# Patient Record
Sex: MALE | Race: ASIAN | NOT HISPANIC OR LATINO
[De-identification: names, ages, dates, MRNs, and addresses within clinical notes are randomized per-mention and may not be internally consistent; named-entity substitution may affect disease eponyms.]

---

## 2017-12-13 ENCOUNTER — APPOINTMENT (OUTPATIENT)
Dept: INTERNAL MEDICINE | Facility: CLINIC | Age: 72
End: 2017-12-13
Payer: MEDICARE

## 2017-12-13 ENCOUNTER — LABORATORY RESULT (OUTPATIENT)
Age: 72
End: 2017-12-13

## 2017-12-13 VITALS
OXYGEN SATURATION: 98 % | TEMPERATURE: 98.4 F | HEART RATE: 61 BPM | DIASTOLIC BLOOD PRESSURE: 80 MMHG | BODY MASS INDEX: 22.22 KG/M2 | RESPIRATION RATE: 16 BRPM | WEIGHT: 150 LBS | SYSTOLIC BLOOD PRESSURE: 120 MMHG | HEIGHT: 69 IN

## 2017-12-13 DIAGNOSIS — D64.9 ANEMIA, UNSPECIFIED: ICD-10-CM

## 2017-12-13 DIAGNOSIS — Z78.9 OTHER SPECIFIED HEALTH STATUS: ICD-10-CM

## 2017-12-13 DIAGNOSIS — Z82.3 FAMILY HISTORY OF STROKE: ICD-10-CM

## 2017-12-13 DIAGNOSIS — R09.89 OTHER SPECIFIED SYMPTOMS AND SIGNS INVOLVING THE CIRCULATORY AND RESPIRATORY SYSTEMS: ICD-10-CM

## 2017-12-13 DIAGNOSIS — Z82.49 FAMILY HISTORY OF ISCHEMIC HEART DISEASE AND OTHER DISEASES OF THE CIRCULATORY SYSTEM: ICD-10-CM

## 2017-12-13 DIAGNOSIS — H40.059 OCULAR HYPERTENSION, UNSPECIFIED EYE: ICD-10-CM

## 2017-12-13 PROCEDURE — G0008: CPT

## 2017-12-13 PROCEDURE — 90732 PPSV23 VACC 2 YRS+ SUBQ/IM: CPT

## 2017-12-13 PROCEDURE — G0439: CPT

## 2017-12-13 PROCEDURE — 90686 IIV4 VACC NO PRSV 0.5 ML IM: CPT

## 2017-12-13 PROCEDURE — G0009: CPT

## 2017-12-14 LAB
25(OH)D3 SERPL-MCNC: 15.3 NG/ML
ALBUMIN SERPL ELPH-MCNC: 4.2 G/DL
ALP BLD-CCNC: 44 U/L
ALT SERPL-CCNC: 17 U/L
ANION GAP SERPL CALC-SCNC: 12 MMOL/L
APPEARANCE: CLEAR
AST SERPL-CCNC: 14 U/L
BASOPHILS # BLD AUTO: 0.07 K/UL
BASOPHILS NFR BLD AUTO: 1.7 %
BILIRUB SERPL-MCNC: 0.8 MG/DL
BILIRUBIN URINE: NEGATIVE
BLOOD URINE: NEGATIVE
BUN SERPL-MCNC: 18 MG/DL
CALCIUM SERPL-MCNC: 9 MG/DL
CHLORIDE SERPL-SCNC: 106 MMOL/L
CHOLEST SERPL-MCNC: 244 MG/DL
CHOLEST/HDLC SERPL: 4.7 RATIO
CO2 SERPL-SCNC: 26 MMOL/L
COLOR: YELLOW
CREAT SERPL-MCNC: 1.07 MG/DL
EOSINOPHIL # BLD AUTO: 0.04 K/UL
EOSINOPHIL NFR BLD AUTO: 0.9
GLUCOSE QUALITATIVE U: NEGATIVE MG/DL
GLUCOSE SERPL-MCNC: 110 MG/DL
HBA1C MFR BLD HPLC: 6 %
HCT VFR BLD CALC: 40.6 %
HDLC SERPL-MCNC: 52 MG/DL
HGB BLD-MCNC: 12.7 G/DL
IRON SATN MFR SERPL: 48 %
IRON SERPL-MCNC: 139 UG/DL
KETONES URINE: NEGATIVE
LDLC SERPL CALC-MCNC: 176 MG/DL
LEUKOCYTE ESTERASE URINE: NEGATIVE
LYMPHOCYTES # BLD AUTO: 1.45 K/UL
LYMPHOCYTES NFR BLD AUTO: 33 %
MAN DIFF?: NORMAL
MCHC RBC-ENTMCNC: 21.9 PG
MCHC RBC-ENTMCNC: 31.3 GM/DL
MCV RBC AUTO: 69.9 FL
MONOCYTES # BLD AUTO: 0.34 K/UL
MONOCYTES NFR BLD AUTO: 7.8 %
NEUTROPHILS # BLD AUTO: 2.22 K/UL
NEUTROPHILS NFR BLD AUTO: 50.4 %
NITRITE URINE: NEGATIVE
PH URINE: 6
PLATELET # BLD AUTO: 182 K/UL
POTASSIUM SERPL-SCNC: 4.5 MMOL/L
PROT SERPL-MCNC: 6.9 G/DL
PROTEIN URINE: NEGATIVE MG/DL
PSA SERPL-MCNC: 2.29 NG/ML
RBC # BLD: 5.81 M/UL
RBC # BLD: 5.81 M/UL
RBC # FLD: 15.3 %
RETICS # AUTO: 0.7 %
RETICS AGGREG/RBC NFR: 39.5 K/UL
SODIUM SERPL-SCNC: 144 MMOL/L
SPECIFIC GRAVITY URINE: 1.03
TIBC SERPL-MCNC: 289 UG/DL
TRIGL SERPL-MCNC: 78 MG/DL
TSH SERPL-ACNC: 1.66 UIU/ML
UIBC SERPL-MCNC: 150 UG/DL
UROBILINOGEN URINE: NEGATIVE MG/DL
WBC # FLD AUTO: 4.4 K/UL

## 2017-12-14 RX ORDER — ERGOCALCIFEROL 1.25 MG/1
1.25 MG CAPSULE, LIQUID FILLED ORAL
Qty: 12 | Refills: 3 | Status: ACTIVE | COMMUNITY
Start: 2017-12-14 | End: 1900-01-01

## 2017-12-19 LAB — HEMOCCULT STL QL IA: NEGATIVE

## 2017-12-20 ENCOUNTER — TRANSCRIPTION ENCOUNTER (OUTPATIENT)
Age: 72
End: 2017-12-20

## 2018-02-13 ENCOUNTER — APPOINTMENT (OUTPATIENT)
Dept: DERMATOLOGY | Facility: CLINIC | Age: 73
End: 2018-02-13
Payer: MEDICARE

## 2018-02-13 VITALS
WEIGHT: 155 LBS | HEIGHT: 69 IN | HEART RATE: 61 BPM | BODY MASS INDEX: 22.96 KG/M2 | DIASTOLIC BLOOD PRESSURE: 88 MMHG | SYSTOLIC BLOOD PRESSURE: 152 MMHG | TEMPERATURE: 98.2 F

## 2018-02-13 DIAGNOSIS — L23.9 ALLERGIC CONTACT DERMATITIS, UNSPECIFIED CAUSE: ICD-10-CM

## 2018-02-13 DIAGNOSIS — L57.0 ACTINIC KERATOSIS: ICD-10-CM

## 2018-02-13 PROCEDURE — 99203 OFFICE O/P NEW LOW 30 MIN: CPT | Mod: 25

## 2018-02-13 PROCEDURE — 17000 DESTRUCT PREMALG LESION: CPT

## 2018-02-20 ENCOUNTER — APPOINTMENT (OUTPATIENT)
Dept: INTERNAL MEDICINE | Facility: CLINIC | Age: 73
End: 2018-02-20
Payer: MEDICARE

## 2018-02-20 VITALS
BODY MASS INDEX: 23.11 KG/M2 | SYSTOLIC BLOOD PRESSURE: 100 MMHG | DIASTOLIC BLOOD PRESSURE: 58 MMHG | WEIGHT: 156 LBS | HEART RATE: 74 BPM | HEIGHT: 69 IN | RESPIRATION RATE: 16 BRPM | OXYGEN SATURATION: 98 % | TEMPERATURE: 98 F

## 2018-02-20 PROCEDURE — 99213 OFFICE O/P EST LOW 20 MIN: CPT

## 2018-03-20 ENCOUNTER — LABORATORY RESULT (OUTPATIENT)
Age: 73
End: 2018-03-20

## 2018-03-20 ENCOUNTER — APPOINTMENT (OUTPATIENT)
Dept: DERMATOLOGY | Facility: CLINIC | Age: 73
End: 2018-03-20

## 2018-03-20 LAB — HBA1C MFR BLD HPLC: 6 %

## 2018-03-21 LAB
CHOLEST SERPL-MCNC: 242 MG/DL
CHOLEST/HDLC SERPL: 4.7 RATIO
HDLC SERPL-MCNC: 51 MG/DL
LDLC SERPL CALC-MCNC: 166 MG/DL
TRIGL SERPL-MCNC: 125 MG/DL

## 2018-03-22 LAB
BASOPHILS NFR BLD AUTO: 2.8 %
EOSINOPHIL NFR BLD AUTO: 0.9 %
HCT VFR BLD CALC: 41.6 %
HGB BLD-MCNC: 13.3 G/DL
LYMPHOCYTES # BLD AUTO: 2.93 K/UL
LYMPHOCYTES NFR BLD AUTO: 49.1 %
MAN DIFF?: NORMAL
MCHC RBC-ENTMCNC: 22.4 PG
MCHC RBC-ENTMCNC: 32 GM/DL
MCV RBC AUTO: 70.2 FL
MONOCYTES NFR BLD AUTO: 5.6 %
NEUTROPHILS # BLD AUTO: 2.49 K/UL
NEUTROPHILS NFR BLD AUTO: 41.7 %
PLATELET # BLD AUTO: 216 K/UL
RBC # BLD: 5.93 M/UL
RBC # FLD: 16 %
WBC # FLD AUTO: 5.97 K/UL

## 2018-04-05 ENCOUNTER — APPOINTMENT (OUTPATIENT)
Dept: GASTROENTEROLOGY | Facility: CLINIC | Age: 73
End: 2018-04-05

## 2018-11-13 ENCOUNTER — LABORATORY RESULT (OUTPATIENT)
Age: 73
End: 2018-11-13

## 2018-11-13 ENCOUNTER — APPOINTMENT (OUTPATIENT)
Dept: INTERNAL MEDICINE | Facility: CLINIC | Age: 73
End: 2018-11-13
Payer: MEDICARE

## 2018-11-13 VITALS
BODY MASS INDEX: 22.73 KG/M2 | SYSTOLIC BLOOD PRESSURE: 107 MMHG | TEMPERATURE: 98.2 F | HEART RATE: 65 BPM | HEIGHT: 68 IN | OXYGEN SATURATION: 98 % | DIASTOLIC BLOOD PRESSURE: 62 MMHG | RESPIRATION RATE: 16 BRPM | WEIGHT: 150 LBS

## 2018-11-13 LAB
ALBUMIN SERPL ELPH-MCNC: 4.1 G/DL
ALP BLD-CCNC: 39 U/L
ALT SERPL-CCNC: 14 U/L
ANION GAP SERPL CALC-SCNC: 13 MMOL/L
APPEARANCE: CLEAR
AST SERPL-CCNC: 15 U/L
BASOPHILS # BLD AUTO: 0.03 K/UL
BASOPHILS NFR BLD AUTO: 0.5 %
BILIRUB SERPL-MCNC: 0.7 MG/DL
BILIRUBIN URINE: NEGATIVE
BLOOD URINE: NEGATIVE
BUN SERPL-MCNC: 17 MG/DL
CALCIUM SERPL-MCNC: 8.9 MG/DL
CHLORIDE SERPL-SCNC: 103 MMOL/L
CHOLEST SERPL-MCNC: 198 MG/DL
CHOLEST/HDLC SERPL: 4 RATIO
CO2 SERPL-SCNC: 26 MMOL/L
COLOR: ABNORMAL
CREAT SERPL-MCNC: 1.14 MG/DL
EOSINOPHIL # BLD AUTO: 0.08 K/UL
EOSINOPHIL NFR BLD AUTO: 1.4 %
GLUCOSE QUALITATIVE U: NEGATIVE MG/DL
GLUCOSE SERPL-MCNC: 99 MG/DL
HCT VFR BLD CALC: 41.9 %
HDLC SERPL-MCNC: 49 MG/DL
HGB BLD-MCNC: 13 G/DL
IMM GRANULOCYTES NFR BLD AUTO: 0.2 %
KETONES URINE: NEGATIVE
LDLC SERPL CALC-MCNC: 137 MG/DL
LEUKOCYTE ESTERASE URINE: NEGATIVE
LYMPHOCYTES # BLD AUTO: 2.54 K/UL
LYMPHOCYTES NFR BLD AUTO: 44.9 %
MAN DIFF?: NORMAL
MCHC RBC-ENTMCNC: 21.8 PG
MCHC RBC-ENTMCNC: 31 GM/DL
MCV RBC AUTO: 70.2 FL
MONOCYTES # BLD AUTO: 0.28 K/UL
MONOCYTES NFR BLD AUTO: 4.9 %
NEUTROPHILS # BLD AUTO: 2.72 K/UL
NEUTROPHILS NFR BLD AUTO: 48.1 %
NITRITE URINE: NEGATIVE
PH URINE: 6.5
PLATELET # BLD AUTO: 180 K/UL
POTASSIUM SERPL-SCNC: 4.3 MMOL/L
PROT SERPL-MCNC: 7 G/DL
PROTEIN URINE: ABNORMAL MG/DL
RBC # BLD: 5.97 M/UL
RBC # FLD: 15.3 %
SODIUM SERPL-SCNC: 142 MMOL/L
SPECIFIC GRAVITY URINE: 1.03
TRIGL SERPL-MCNC: 62 MG/DL
TSH SERPL-ACNC: 1.95 UIU/ML
UROBILINOGEN URINE: NEGATIVE MG/DL
WBC # FLD AUTO: 5.66 K/UL

## 2018-11-13 PROCEDURE — 90471 IMMUNIZATION ADMIN: CPT

## 2018-11-13 PROCEDURE — 90662 IIV NO PRSV INCREASED AG IM: CPT

## 2018-11-13 PROCEDURE — 99213 OFFICE O/P EST LOW 20 MIN: CPT | Mod: 25

## 2018-11-13 PROCEDURE — G0008: CPT | Mod: 59

## 2018-11-13 PROCEDURE — 90736 HZV VACCINE LIVE SUBQ: CPT

## 2018-11-13 RX ORDER — BETAMETHASONE VALERATE 1.2 MG/G
0.1 OINTMENT TOPICAL TWICE DAILY
Qty: 1 | Refills: 2 | Status: DISCONTINUED | COMMUNITY
Start: 2018-02-13 | End: 2018-11-13

## 2018-11-13 RX ORDER — BRIMONIDINE TARTRATE 5 MG/G
0.33 GEL TOPICAL DAILY
Qty: 1 | Refills: 1 | Status: DISCONTINUED | COMMUNITY
Start: 2018-02-20 | End: 2018-11-13

## 2018-11-13 NOTE — ASSESSMENT
[FreeTextEntry1] : 71 yo asymptomatic pt with HLP,refused statins,preDM.will continue diet,exercise.labs taken.refused statins,may consider Welchol,Zetia.\par \par \par

## 2018-11-13 NOTE — PHYSICAL EXAM
[No Acute Distress] : no acute distress [Well Nourished] : well nourished [Well Developed] : well developed [Well-Appearing] : well-appearing [Normal Sclera/Conjunctiva] : normal sclera/conjunctiva [PERRL] : pupils equal round and reactive to light [EOMI] : extraocular movements intact [Normal Outer Ear/Nose] : the outer ears and nose were normal in appearance [Normal Oropharynx] : the oropharynx was normal [No JVD] : no jugular venous distention [Supple] : supple [No Lymphadenopathy] : no lymphadenopathy [Thyroid Normal, No Nodules] : the thyroid was normal and there were no nodules present [No Respiratory Distress] : no respiratory distress  [Clear to Auscultation] : lungs were clear to auscultation bilaterally [No Accessory Muscle Use] : no accessory muscle use [Normal Rate] : normal rate  [Regular Rhythm] : with a regular rhythm [Normal S1, S2] : normal S1 and S2 [No Murmur] : no murmur heard [Pedal Pulses Present] : the pedal pulses are present [No Edema] : there was no peripheral edema [Soft] : abdomen soft [Non Tender] : non-tender [No Masses] : no abdominal mass palpated [No HSM] : no HSM [Declined Rectal Exam] : declined rectal exam [Normal Posterior Cervical Nodes] : no posterior cervical lymphadenopathy [Normal Anterior Cervical Nodes] : no anterior cervical lymphadenopathy [No Rash] : no rash [Normal Gait] : normal gait [No Focal Deficits] : no focal deficits [Normal Affect] : the affect was normal [Normal Insight/Judgement] : insight and judgment were intact [No Carotid Bruits] : no carotid bruits [Normal Supraclavicular Nodes] : no supraclavicular lymphadenopathy [Normal Axillary Nodes] : no axillary lymphadenopathy [Kyphosis] : no kyphosis [Scoliosis] : no scoliosis [No Joint Swelling] : no joint swelling [Comprehensive Foot Exam Normal] : Right and left foot were examined and both feet are normal. No ulcers in either foot. Toes are normal and with full ROM.  Normal tactile sensation with monofilament testing throughout both feet [de-identified] : no previously heard faint carotid bruit,b/l  [de-identified] : keraroric lesions,face.maculopapular rash,R.LE

## 2018-11-13 NOTE — HEALTH RISK ASSESSMENT
[Patient reported colonoscopy was normal] : Patient reported colonoscopy was normal [HIV Test offered] : HIV Test offered [Hepatitis C test offered] : Hepatitis C test offered [Fully functional (bathing, dressing, toileting, transferring, walking, feeding)] : Fully functional (bathing, dressing, toileting, transferring, walking, feeding) [Fully functional (using the telephone, shopping, preparing meals, housekeeping, doing laundry, using] : Fully functional and needs no help or supervision to perform IADLs (using the telephone, shopping, preparing meals, housekeeping, doing laundry, using transportation, managing medications and managing finances) [No falls in past year] : Patient reported no falls in the past year [0] : 2) Feeling down, depressed, or hopeless: Not at all (0) [Discussed at today's visit] : Advance Directives Discussed at today's visit [Change in mental status noted] : No change in mental status noted [Reports changes in vision] : Reports no changes in vision [Reports changes in dental health] : Reports no changes in dental health [ColonoscopyDate] : 04/18 [] : No [[id number]] : 0

## 2018-11-13 NOTE — HISTORY OF PRESENT ILLNESS
[FreeTextEntry1] : routine exam [de-identified] : 72 yo asymptomatic pt f/u.h/o HLP.LDL>160,pt refused statins.also PqC1k=7,%.\par on Vit.D supplement.

## 2018-11-13 NOTE — COUNSELING
[Healthy eating counseling provided] : healthy eating [Activity counseling provided] : activity [Low Fat Diet] : Low fat diet [Low Salt Diet] : Low salt diet [Walking] : Walking [None] : None [Good understanding] : Patient has a good understanding of lifestyle changes and the steps needed to achieve self management goals [de-identified] : ADA diet

## 2018-11-14 LAB
25(OH)D3 SERPL-MCNC: 40.9 NG/ML
HBA1C MFR BLD HPLC: 6.1 %

## 2018-11-19 LAB
HCV AB SER QL: NONREACTIVE
HCV S/CO RATIO: 0.18 S/CO
HIV1+2 AB SPEC QL IA.RAPID: NONREACTIVE

## 2019-04-30 ENCOUNTER — TRANSCRIPTION ENCOUNTER (OUTPATIENT)
Age: 74
End: 2019-04-30

## 2019-04-30 ENCOUNTER — APPOINTMENT (OUTPATIENT)
Dept: INTERNAL MEDICINE | Facility: CLINIC | Age: 74
End: 2019-04-30
Payer: MEDICARE

## 2019-04-30 VITALS
TEMPERATURE: 97.9 F | HEIGHT: 68 IN | SYSTOLIC BLOOD PRESSURE: 119 MMHG | DIASTOLIC BLOOD PRESSURE: 72 MMHG | OXYGEN SATURATION: 97 % | HEART RATE: 72 BPM | BODY MASS INDEX: 22.28 KG/M2 | RESPIRATION RATE: 16 BRPM | WEIGHT: 147 LBS

## 2019-04-30 DIAGNOSIS — E78.5 HYPERLIPIDEMIA, UNSPECIFIED: ICD-10-CM

## 2019-04-30 DIAGNOSIS — R73.03 PREDIABETES.: ICD-10-CM

## 2019-04-30 DIAGNOSIS — Z00.00 ENCOUNTER FOR GENERAL ADULT MEDICAL EXAMINATION W/OUT ABNORMAL FINDINGS: ICD-10-CM

## 2019-04-30 DIAGNOSIS — J18.9 PNEUMONIA, UNSPECIFIED ORGANISM: ICD-10-CM

## 2019-04-30 PROCEDURE — 99213 OFFICE O/P EST LOW 20 MIN: CPT

## 2019-04-30 NOTE — ASSESSMENT
[FreeTextEntry1] : 72 yo  pt with recent Dx of Pn,L.lung,on Levaquin.clinically pt is asymptomatic, lungs clear.\par will f/u labs,CxR.\par PMH HLP,refused statins,preDM.will continue diet,exercise.labs taken.may consider Welchol,Zetia.\par \par \par

## 2019-04-30 NOTE — COUNSELING
[Healthy eating counseling provided] : healthy eating [Activity counseling provided] : activity [Low Fat Diet] : Low fat diet [Low Salt Diet] : Low salt diet [None] : None [Good understanding] : Patient has a good understanding of lifestyle changes and the steps needed to achieve self management goals [de-identified] : ADA diet

## 2019-04-30 NOTE — PHYSICAL EXAM
[No Acute Distress] : no acute distress [Well Nourished] : well nourished [Well Developed] : well developed [Well-Appearing] : well-appearing [Normal Sclera/Conjunctiva] : normal sclera/conjunctiva [PERRL] : pupils equal round and reactive to light [EOMI] : extraocular movements intact [Normal Outer Ear/Nose] : the outer ears and nose were normal in appearance [Normal Oropharynx] : the oropharynx was normal [No JVD] : no jugular venous distention [Supple] : supple [No Lymphadenopathy] : no lymphadenopathy [Thyroid Normal, No Nodules] : the thyroid was normal and there were no nodules present [No Respiratory Distress] : no respiratory distress  [Clear to Auscultation] : lungs were clear to auscultation bilaterally [No Accessory Muscle Use] : no accessory muscle use [Normal Rate] : normal rate  [Regular Rhythm] : with a regular rhythm [Normal S1, S2] : normal S1 and S2 [No Murmur] : no murmur heard [No Carotid Bruits] : no carotid bruits [Pedal Pulses Present] : the pedal pulses are present [No Edema] : there was no peripheral edema [Soft] : abdomen soft [Non Tender] : non-tender [No Masses] : no abdominal mass palpated [No HSM] : no HSM [Declined Rectal Exam] : declined rectal exam [Normal Supraclavicular Nodes] : no supraclavicular lymphadenopathy [Normal Axillary Nodes] : no axillary lymphadenopathy [Normal Posterior Cervical Nodes] : no posterior cervical lymphadenopathy [Normal Anterior Cervical Nodes] : no anterior cervical lymphadenopathy [No Joint Swelling] : no joint swelling [No Rash] : no rash [Normal Gait] : normal gait [No Focal Deficits] : no focal deficits [Normal Affect] : the affect was normal [Normal Insight/Judgement] : insight and judgment were intact [Comprehensive Foot Exam Normal] : Right and left foot were examined and both feet are normal. No ulcers in either foot. Toes are normal and with full ROM.  Normal tactile sensation with monofilament testing throughout both feet [Kyphosis] : no kyphosis [Scoliosis] : no scoliosis [de-identified] : keraroric lesions,face.maculopapular rash,R.LE

## 2019-05-01 ENCOUNTER — LABORATORY RESULT (OUTPATIENT)
Age: 74
End: 2019-05-01

## 2019-05-01 DIAGNOSIS — R94.5 ABNORMAL RESULTS OF LIVER FUNCTION STUDIES: ICD-10-CM

## 2019-05-01 DIAGNOSIS — R89.9 UNSPECIFIED ABNORMAL FINDING IN SPECIMENS FROM OTHER ORGANS, SYSTEMS AND TISSUES: ICD-10-CM

## 2019-05-01 LAB
25(OH)D3 SERPL-MCNC: 23.9 NG/ML
ALBUMIN SERPL ELPH-MCNC: 3.6 G/DL
ALP BLD-CCNC: 290 U/L
ALT SERPL-CCNC: 156 U/L
ANION GAP SERPL CALC-SCNC: 12 MMOL/L
APPEARANCE: CLEAR
AST SERPL-CCNC: 94 U/L
BASOPHILS # BLD AUTO: 0.25 K/UL
BASOPHILS NFR BLD AUTO: 3.5 %
BILIRUB SERPL-MCNC: 0.6 MG/DL
BILIRUBIN URINE: NEGATIVE
BLOOD URINE: NEGATIVE
BUN SERPL-MCNC: 19 MG/DL
CALCIUM SERPL-MCNC: 9.2 MG/DL
CHLORIDE SERPL-SCNC: 105 MMOL/L
CHOLEST SERPL-MCNC: 199 MG/DL
CHOLEST/HDLC SERPL: 8.3 RATIO
CO2 SERPL-SCNC: 24 MMOL/L
COLOR: YELLOW
CREAT SERPL-MCNC: 1.18 MG/DL
CRP SERPL-MCNC: 1.21 MG/DL
EOSINOPHIL # BLD AUTO: 0.18 K/UL
EOSINOPHIL NFR BLD AUTO: 2.6 %
ESTIMATED AVERAGE GLUCOSE: 151 MG/DL
GLUCOSE QUALITATIVE U: NEGATIVE
GLUCOSE SERPL-MCNC: 115 MG/DL
HBA1C MFR BLD HPLC: 6.9 %
HCT VFR BLD CALC: 41 %
HDLC SERPL-MCNC: 24 MG/DL
HGB BLD-MCNC: 12.6 G/DL
KETONES URINE: NEGATIVE
LDLC SERPL CALC-MCNC: 143 MG/DL
LEUKOCYTE ESTERASE URINE: NEGATIVE
LYMPHOCYTES # BLD AUTO: 2.17 K/UL
LYMPHOCYTES NFR BLD AUTO: 30.7 %
MAN DIFF?: NORMAL
MCHC RBC-ENTMCNC: 21.5 PG
MCHC RBC-ENTMCNC: 30.7 GM/DL
MCV RBC AUTO: 70 FL
MONOCYTES # BLD AUTO: 0.31 K/UL
MONOCYTES NFR BLD AUTO: 4.4 %
NEUTROPHILS # BLD AUTO: 4.16 K/UL
NEUTROPHILS NFR BLD AUTO: 57.9 %
NITRITE URINE: NEGATIVE
PH URINE: 6.5
PLATELET # BLD AUTO: 575 K/UL
POTASSIUM SERPL-SCNC: 5 MMOL/L
PROT SERPL-MCNC: 6.8 G/DL
PROTEIN URINE: NEGATIVE
RBC # BLD: 5.86 M/UL
RBC # FLD: 17 %
SODIUM SERPL-SCNC: 141 MMOL/L
SPECIFIC GRAVITY URINE: 1.02
TRIGL SERPL-MCNC: 162 MG/DL
UROBILINOGEN URINE: NORMAL
WBC # FLD AUTO: 7.07 K/UL

## 2019-05-08 ENCOUNTER — FORM ENCOUNTER (OUTPATIENT)
Age: 74
End: 2019-05-08

## 2019-05-09 ENCOUNTER — OUTPATIENT (OUTPATIENT)
Dept: OUTPATIENT SERVICES | Facility: HOSPITAL | Age: 74
LOS: 1 days | End: 2019-05-09
Payer: MEDICARE

## 2019-05-09 DIAGNOSIS — J18.9 PNEUMONIA, UNSPECIFIED ORGANISM: ICD-10-CM

## 2019-05-09 PROCEDURE — 71046 X-RAY EXAM CHEST 2 VIEWS: CPT

## 2019-05-09 PROCEDURE — 71046 X-RAY EXAM CHEST 2 VIEWS: CPT | Mod: 26

## 2019-05-13 LAB
ALBUMIN MFR SERPL ELPH: 54.2 %
ALBUMIN SERPL ELPH-MCNC: 3.8 G/DL
ALBUMIN SERPL-MCNC: 3.6 G/DL
ALBUMIN/GLOB SERPL: 1.2 RATIO
ALBUPE: 12.8 %
ALP BLD-CCNC: 125 U/L
ALPHA1 GLOB MFR SERPL ELPH: 4.4 %
ALPHA1 GLOB SERPL ELPH-MCNC: 0.3 G/DL
ALPHA1UPE: 37.7 %
ALPHA2 GLOB MFR SERPL ELPH: 10.7 %
ALPHA2 GLOB SERPL ELPH-MCNC: 0.7 G/DL
ALPHA2UPE: 19.8 %
ALT SERPL-CCNC: 49 U/L
ANION GAP SERPL CALC-SCNC: 10 MMOL/L
AST SERPL-CCNC: 22 U/L
B-GLOBULIN MFR SERPL ELPH: 13.1 %
B-GLOBULIN SERPL ELPH-MCNC: 0.9 G/DL
BASOPHILS # BLD AUTO: 0.06 K/UL
BASOPHILS NFR BLD AUTO: 1.2 %
BETAUPE: 11.4 %
BILIRUB SERPL-MCNC: 0.7 MG/DL
BUN SERPL-MCNC: 18 MG/DL
CALCIUM SERPL-MCNC: 9.2 MG/DL
CHLORIDE SERPL-SCNC: 106 MMOL/L
CO2 SERPL-SCNC: 27 MMOL/L
CREAT 24H UR-MCNC: NORMAL G/24 H
CREAT SERPL-MCNC: 0.96 MG/DL
CREATININE UR (MAYO): 137 MG/DL
EOSINOPHIL # BLD AUTO: 0.06 K/UL
EOSINOPHIL NFR BLD AUTO: 1.2 %
ERYTHROCYTE [SEDIMENTATION RATE] IN BLOOD BY WESTERGREN METHOD: 49 MM/HR
FERRITIN SERPL-MCNC: 827 NG/ML
GAMMA GLOB FLD ELPH-MCNC: 1.2 G/DL
GAMMA GLOB MFR SERPL ELPH: 17.6 %
GAMMAUPE: 18.3 %
GGT SERPL-CCNC: 84 U/L
GLUCOSE SERPL-MCNC: 119 MG/DL
HCT VFR BLD CALC: 40.4 %
HGB BLD-MCNC: 12 G/DL
IGA 24H UR QL IFE: NORMAL
IMM GRANULOCYTES NFR BLD AUTO: 0.2 %
INTERPRETATION SERPL IEP-IMP: NORMAL
IRON SATN MFR SERPL: 44 %
IRON SERPL-MCNC: 130 UG/DL
KAPPA LC 24H UR QL: NORMAL
LYMPHOCYTES # BLD AUTO: 2.51 K/UL
LYMPHOCYTES NFR BLD AUTO: 50.1 %
MAN DIFF?: NORMAL
MCHC RBC-ENTMCNC: 21.3 PG
MCHC RBC-ENTMCNC: 29.7 GM/DL
MCV RBC AUTO: 71.8 FL
MONOCYTES # BLD AUTO: 0.27 K/UL
MONOCYTES NFR BLD AUTO: 5.4 %
NEUTROPHILS # BLD AUTO: 2.1 K/UL
NEUTROPHILS NFR BLD AUTO: 41.9 %
PLATELET # BLD AUTO: 343 K/UL
POTASSIUM SERPL-SCNC: 5.2 MMOL/L
PROT PATTERN 24H UR ELPH-IMP: NORMAL
PROT SERPL-MCNC: 6.5 G/DL
PROT SERPL-MCNC: 6.6 G/DL
PROT SERPL-MCNC: 6.6 G/DL
PROT UR-MCNC: 10 MG/DL
PROT UR-MCNC: 10 MG/DL
RBC # BLD: 5.63 M/UL
RBC # FLD: 16.3 %
SODIUM SERPL-SCNC: 143 MMOL/L
SPECIMEN VOL 24H UR: NORMAL ML
TIBC SERPL-MCNC: 294 UG/DL
UIBC SERPL-MCNC: 164 UG/DL
WBC # FLD AUTO: 5.01 K/UL

## 2024-06-04 NOTE — HISTORY OF PRESENT ILLNESS
[de-identified] : 74 yo was in [place] when he developed fever 104 and sore throat.his CBC was normal twice on 04/21 and 04 23,he was advised Ax's but he refused until he came back to [place] and visited [facility] where he was  Dxed of Mino BERNAL.lung on CxR,his T was normal,on 04/25/19.\par pt denied cough,SOB,hemoptysis,pleuritic pain .Rxed with Levaquine 750 mg,and Fluticasone nasal spray,pt took Ax for 6 days,one more day left.pt is asymptomatic by now.\par h/o HLP.LDL>160,pt refused statins.also PzT6g=5,%.\par on Vit.D supplement. [Follow-Up] : a [unfilled] follow-up visit  [Parent] : parent [FreeTextEntry2] : 10/12/2023